# Patient Record
Sex: FEMALE | URBAN - METROPOLITAN AREA
[De-identification: names, ages, dates, MRNs, and addresses within clinical notes are randomized per-mention and may not be internally consistent; named-entity substitution may affect disease eponyms.]

---

## 2021-04-22 ENCOUNTER — APPOINTMENT (RX ONLY)
Dept: URBAN - METROPOLITAN AREA CLINIC 111 | Facility: CLINIC | Age: 41
Setting detail: DERMATOLOGY
End: 2021-04-22

## 2021-04-22 DIAGNOSIS — Z41.9 ENCOUNTER FOR PROCEDURE FOR PURPOSES OTHER THAN REMEDYING HEALTH STATE, UNSPECIFIED: ICD-10-CM

## 2021-04-22 PROCEDURE — ? THREADING

## 2021-04-22 NOTE — PROCEDURE: THREADING
Post-Care Instructions: I reviewed with the patient in detail post-care instructions.
Detail Level: Simple
Techique: The unwanted hair in the treatment area(s) were removed using the threading technique.
Price (Use Numbers Only, No Special Characters Or $): 15